# Patient Record
Sex: MALE | Race: WHITE
[De-identification: names, ages, dates, MRNs, and addresses within clinical notes are randomized per-mention and may not be internally consistent; named-entity substitution may affect disease eponyms.]

---

## 2018-01-23 ENCOUNTER — HOSPITAL ENCOUNTER (OUTPATIENT)
Dept: HOSPITAL 17 - PHSDC | Age: 73
Setting detail: OBSERVATION
LOS: 1 days | Discharge: HOME | End: 2018-01-24
Attending: OTOLARYNGOLOGY | Admitting: OTOLARYNGOLOGY
Payer: MEDICARE

## 2018-01-23 VITALS
TEMPERATURE: 97.8 F | HEART RATE: 66 BPM | SYSTOLIC BLOOD PRESSURE: 138 MMHG | RESPIRATION RATE: 17 BRPM | DIASTOLIC BLOOD PRESSURE: 73 MMHG | OXYGEN SATURATION: 97 %

## 2018-01-23 VITALS
DIASTOLIC BLOOD PRESSURE: 78 MMHG | SYSTOLIC BLOOD PRESSURE: 142 MMHG | RESPIRATION RATE: 16 BRPM | TEMPERATURE: 96.7 F | OXYGEN SATURATION: 100 % | HEART RATE: 65 BPM

## 2018-01-23 VITALS — OXYGEN SATURATION: 100 %

## 2018-01-23 VITALS — WEIGHT: 195.11 LBS | HEIGHT: 71 IN | BODY MASS INDEX: 27.31 KG/M2

## 2018-01-23 DIAGNOSIS — I10: ICD-10-CM

## 2018-01-23 DIAGNOSIS — J34.2: ICD-10-CM

## 2018-01-23 DIAGNOSIS — J34.3: ICD-10-CM

## 2018-01-23 DIAGNOSIS — R09.81: ICD-10-CM

## 2018-01-23 DIAGNOSIS — J34.89: Primary | ICD-10-CM

## 2018-01-23 PROCEDURE — 93005 ELECTROCARDIOGRAM TRACING: CPT

## 2018-01-23 PROCEDURE — 30140 RESECT INFERIOR TURBINATE: CPT

## 2018-01-23 PROCEDURE — 94762 N-INVAS EAR/PLS OXIMTRY CONT: CPT

## 2018-01-23 PROCEDURE — 30520 REPAIR OF NASAL SEPTUM: CPT

## 2018-01-23 PROCEDURE — 96365 THER/PROPH/DIAG IV INF INIT: CPT

## 2018-01-23 PROCEDURE — G0378 HOSPITAL OBSERVATION PER HR: HCPCS

## 2018-01-23 PROCEDURE — 96361 HYDRATE IV INFUSION ADD-ON: CPT

## 2018-01-23 PROCEDURE — 00160 ANES PX NOSE&SINUS NOS: CPT

## 2018-01-23 PROCEDURE — 96366 THER/PROPH/DIAG IV INF ADDON: CPT

## 2018-01-23 RX ADMIN — OXYTOCIN SCH MLS/HR: 10 INJECTION, SOLUTION INTRAMUSCULAR; INTRAVENOUS at 23:25

## 2018-01-23 RX ADMIN — HYDROCODONE BITARTRATE AND ACETAMINOPHEN PRN TAB: 5; 325 TABLET ORAL at 15:36

## 2018-01-23 RX ADMIN — OXYTOCIN SCH MLS/HR: 10 INJECTION, SOLUTION INTRAMUSCULAR; INTRAVENOUS at 15:00

## 2018-01-23 RX ADMIN — GABAPENTIN SCH MG: 300 CAPSULE ORAL at 21:25

## 2018-01-23 RX ADMIN — PROPRANOLOL HYDROCHLORIDE SCH MG: 10 TABLET ORAL at 21:25

## 2018-01-23 RX ADMIN — SODIUM CHLORIDE SCH MLS/HR: 900 INJECTION INTRAVENOUS at 21:25

## 2018-01-23 RX ADMIN — HYDROCODONE BITARTRATE AND ACETAMINOPHEN PRN TAB: 5; 325 TABLET ORAL at 21:26

## 2018-01-23 NOTE — EKG
Date Performed: 01/23/2018       Time Performed: 10:11:52

 

PTAGE:      72 years

 

EKG:      SINUS BRADYCARDIA MARKED LEFT AXIS DEVIATION POSSIBLE ANTERIOR MYOCARDIAL INFARCTION ABNORM
AL ECG

 

PREVIOUS TRACING       : 03/25/2016 00.09

 

DOCTOR:   Mariangel Han  Interpretating Date/Time  01/23/2018 17:10:41

## 2018-01-24 VITALS
HEART RATE: 58 BPM | RESPIRATION RATE: 16 BRPM | SYSTOLIC BLOOD PRESSURE: 146 MMHG | TEMPERATURE: 97.1 F | DIASTOLIC BLOOD PRESSURE: 96 MMHG | OXYGEN SATURATION: 100 %

## 2018-01-24 VITALS
TEMPERATURE: 96.5 F | RESPIRATION RATE: 16 BRPM | OXYGEN SATURATION: 100 % | DIASTOLIC BLOOD PRESSURE: 80 MMHG | SYSTOLIC BLOOD PRESSURE: 165 MMHG | HEART RATE: 53 BPM

## 2018-01-24 VITALS — OXYGEN SATURATION: 96 %

## 2018-01-24 RX ADMIN — PROPRANOLOL HYDROCHLORIDE SCH MG: 10 TABLET ORAL at 08:07

## 2018-01-24 RX ADMIN — GABAPENTIN SCH MG: 300 CAPSULE ORAL at 08:08

## 2018-01-24 RX ADMIN — SODIUM CHLORIDE SCH MLS/HR: 900 INJECTION INTRAVENOUS at 04:19

## 2018-01-25 ENCOUNTER — HOSPITAL ENCOUNTER (EMERGENCY)
Dept: HOSPITAL 17 - PHED | Age: 73
Discharge: HOME | End: 2018-01-25
Payer: MEDICARE

## 2018-01-25 VITALS
SYSTOLIC BLOOD PRESSURE: 132 MMHG | HEART RATE: 74 BPM | TEMPERATURE: 97.4 F | DIASTOLIC BLOOD PRESSURE: 69 MMHG | RESPIRATION RATE: 18 BRPM | OXYGEN SATURATION: 96 %

## 2018-01-25 VITALS — WEIGHT: 195.11 LBS | HEIGHT: 71 IN | BODY MASS INDEX: 27.31 KG/M2

## 2018-01-25 DIAGNOSIS — F17.200: ICD-10-CM

## 2018-01-25 DIAGNOSIS — E78.5: ICD-10-CM

## 2018-01-25 DIAGNOSIS — Z85.828: ICD-10-CM

## 2018-01-25 DIAGNOSIS — I10: ICD-10-CM

## 2018-01-25 DIAGNOSIS — F31.9: ICD-10-CM

## 2018-01-25 DIAGNOSIS — Z98.890: ICD-10-CM

## 2018-01-25 DIAGNOSIS — N40.0: ICD-10-CM

## 2018-01-25 DIAGNOSIS — Z87.39: ICD-10-CM

## 2018-01-25 DIAGNOSIS — R04.0: Primary | ICD-10-CM

## 2018-01-25 PROCEDURE — 30901 CONTROL OF NOSEBLEED: CPT

## 2018-01-25 NOTE — PD
HPI


Chief Complaint:  Nosebleed


Time Seen by Provider:  11:01


Travel History


International Travel<30 days:  No


Contact w/Intl Traveler<30days:  No


Traveled to known affect area:  No





History of Present Illness


HPI


PATIENT HAD SEPTUM SURGERY WITH DR TATUM (ENT) ON TUESDAY AND D/C WITH NASAL 

PACKING....PACKING REMOVED ON WEDNESDAY MORNING.....AFTERWARDS PATIENT STARTED 

TO HAVE NOSEBLEED WHICH WAS TAKEN CARE OF AT  [facility] WITH NASAL SPRAY AND 

EXTERNAL PRESSURE...THIS ONLY WORKED FOR ABOUT 2HRS THEN PT STARTED TO BLEED 

FROM NOSE AGAIN.  PATIENT RETURNS HERE FOR CARE.  DENIES TAKING ANY 

ANTICOAGULANT INCLUDING ASA








ALL:DENIES


SIG PMHX:HTN, HYPERLIPIDEMIA, BIPOLAR


SIG RECENT:SEPTUM SURGERY (CAUSE OF NOSE BLEEDS)


.





PFSH


Past Medical History


Bipolar Disorder:  Yes


Depression:  Yes


Cancer:  Yes (SKIN CANCER)


Cardiovascular Problems:  No


High Cholesterol:  Yes


Diabetes:  No


Endocrine:  No


Genitourinary:  Yes (BPH)


Headaches:  No (See EMR)


Hepatitis:  No


Hiatal Hernia:  No


Immune Disorder:  No


Musculoskeletal:  Yes (BACK INURY L1-S2)


Neurologic:  No


Psychiatric:  No


Reproductive:  No


Respiratory:  Yes (NASAL CONGESTION, SNORING)


Thyroid Disease:  No


Triglycerides - High:  Yes





Past Surgical History


Abdominal Surgery:  Yes (hernia repair)


AICD:  No


Body Medical Devices:  RODS IN BACK


Cardiac Surgery:  No


Ear Surgery:  No


Endocrine Surgery:  No


Eye Surgery:  No


Genitourinary Surgery:  No


Gynecologic Surgery:  No


Joint Replacement:  No


Oral Surgery:  No


Pacemaker:  No


Thoracic Surgery:  No





Social History


Alcohol Use:  No


Tobacco Use:  Yes (1 PPD)


Substance Use:  No





Allergies-Medications


(Allergen,Severity, Reaction):  


Coded Allergies:  


     No Known Allergies (Unverified  Allergy, Unknown, 1/23/18)


Reported Meds & Prescriptions





Reported Meds & Active Scripts


Active


Xanax (Alprazolam) 0.5 Mg Tab 0.5 Mg PO Q8H PRN 2 Days


Reported


Gabapentin 600 Mg Tab 600 Mg PO BID


Abilify (Aripiprazole) 15 Mg Tab 15 Mg PO DAILY


Tamsulosin (Tamsulosin HCl) 0.4 Mg Cap 0.4 Mg PO HS


Lamotrigine 200 Mg Tab 200 Mg PO DAILY








Review of Systems


Except as stated in HPI:  all other systems reviewed are Neg


General / Constitutional:  No: Fever


Eyes:  No: Visual changes


HENT:  Positive: Nosebleed


Cardiovascular:  No: Chest Pain or Discomfort


Respiratory:  No: Shortness of Breath


Gastrointestinal:  No: Abdominal Pain


Genitourinary:  No: Dysuria


Musculoskeletal:  No: Pain


Skin:  No Rash


Neurologic:  No: Weakness


Psychiatric:  No: Depression


Endocrine:  No: Polydipsia


Hematologic/Lymphatic:  No: Easy Bruising





Physical Exam


Narrative


GENERAL: 


SKIN: Warm and dry.


HEAD: Atraumatic. Normocephalic. 


EYES: Pupils equal and round. No scleral icterus. No injection or drainage. 


ENT: No nasal bleeding or discharge.  Mucous membranes pink and moist.  SMALL 

ANTERIOR EPISTAXIS NOTED BILATERALLY, NO ARTERIAL BLEEDING NO POSTERIOR 

BLEEDING EITHER


NECK: Trachea midline. No JVD. 


CARDIOVASCULAR: Regular rate and rhythm.  


RESPIRATORY: No accessory muscle use. Clear to auscultation. Breath sounds 

equal bilaterally. 


GASTROINTESTINAL: Abdomen soft, non-tender, nondistended. Hepatic and splenic 

margins not palpable. 


MUSCULOSKELETAL: Extremities without clubbing, cyanosis, or edema. No obvious 

deformities. 


NEUROLOGICAL: Awake and alert. No obvious cranial nerve deficits.  Motor 

grossly within normal limits. Five out of 5 muscle strength in the arms and 

legs.  Normal speech.


PSYCHIATRIC: Appropriate mood and affect; insight and judgment normal.





Data


Data


Last Documented VS





Orders





 Orders


Ed Discharge Order (1/25/18 11:12)








MDM


Medical Decision Making


Medical Screen Exam Complete:  Yes


Emergency Medical Condition:  Yes


Medical Record Reviewed:  Yes


Differential Diagnosis


EPISTAXIS


Narrative Course


upon examination, anterior venous epistaxis noted without any apparent arterial 

component....however due to continue oozing and post surgical who already was 

treated with pheylephrine and external pressure at another er which failed as 

patient returns with same complaint...bilateral packing will be performed





Procedures


**Procedure Narrative**


PLACED BILATERAL RHINOROCKET 5.5 CM AND INFLATED TO 3ML BILATERALLY BECAUSE 

THERE WAS NO ACTIVE BLEEDING...IF PATIENT CONTINUES TO BLEED HE WAS ADVISED TO 

RETURN TO ADD 3 MORE CC OF AIR.





Diagnosis





 Primary Impression:  


 ANTERIOR EPISTAXIS S/P PACKING


Patient Instructions:  Epistaxis (DC), General Instructions





***Additional Instructions:  


PLEASE CALL YOUR ENT FOR FURTHER CARE OF YOUR POST OPERATIVE NOSEBLEED.  KEEP 

PACKING IN UNTIL YOU SEE ENT


Scripts


Alprazolam (Xanax) 0.5 Mg Tab


0.5 MG PO Q8H Y for ANXIETY for 2 Days, #6 TAB 0 Refills


   Prov: Lightburn,Akli Andrew MD         1/25/18


Disposition:  01 DISCHARGE HOME


Condition:  Stable











Kali Dillon MD Jan 25, 2018 11:11

## 2018-02-04 NOTE — MP
cc:

MATTHEW MORALES M.D.

****

 

 

DATE OF SURGERY: 01/23/2018.

 

PREOPERATIVE DIAGNOSIS:

1. Nasal airway obstruction.

2. Nasal septal deviation.

3. Hypertrophy of inferior turbinates.

 

POSTOPERATIVE DIAGNOSIS:

1. Nasal airway obstruction.

2. Nasal septal deviation.

3. Hypertrophy of inferior turbinates.

 

OPERATIVE PROCEDURE PERFORMED:

1. Open repair nasal septal fracture.

2. Bilateral submucosal resection of inferior turbinates.

 

SURGEON:

Matthew Morales MD.

 

INDICATIONS FOR THE PROCEDURE:

Documented in the history and physical.

 

DESCRIPTION OF THE PROCEDURE IN DETAIL:

The patient was taken to OR 2 and placed in the supine position. Following

induction of general anesthesia and intubation, the nose was packed bilaterally

with cotton pledgets saturated in 0.05% Oxymetazoline. The septal mucosa and

inferior turbinates were injected with a total of 10 mL of 1% Xylocaine with

epinephrine 1:100,000 and he was then prepped and draped for surgery.  The

packing was removed and a hemitransfixion incision was made in the left nasal

vestibule, and through this incision the mucosa of septum was elevated

bilaterally as far as the junction of the bony and cartilaginous septum and

this was followed by removal of a cumulative area of 2.0 x 2.5 cm of the

quadrangular cartilage preserving 1.5 cm dorsal and caudal cartilaginous

struts. The mucosa was elevated from the bony septum and the maxillary crest

and these were removed using the Eder forceps. The incision was

then closed with a running suture of 4-0 chromic and the mucosal layers of

septum were approximated to each other with a quilting stitch of 4-0 plain gut.

 

 

The inferior turbinates were addressed next. They were fractured out medially

and a stab incision was made along the anterior end of each turbinate, and

through this incision a submucosal pocket was opened medial to the conchal bone

using the Addison elevator. In this pocket, the Olympus submucosal debrider was

inserted and activated both medially and inferiorly.  The instrument was then

returned into the pocket and the bipolar cautery was activated to control

bleeding.  Additional cautery was applied to the incision site on each side.

The remnants of the inferior turbinates were then re-lateralized to the lateral

nasal wall.

 

The nose was packed with 7.5 cm rapid rhino packs each inflated with 7 mL of

air and the procedure was terminated.  The patient was reversed from anesthesia

and taken to recovery in good condition.  There were no complications.  Blood

loss was 100 mL.

 

 

 

                              _________________________________

                              MD PRIMO Gonzalez/EVA

D:  2/4/2018/12:00 PM

T:  2/4/2018/3:25 PM

Visit #:  [id number]

Job #:  [id number]